# Patient Record
Sex: FEMALE | Race: OTHER | HISPANIC OR LATINO
[De-identification: names, ages, dates, MRNs, and addresses within clinical notes are randomized per-mention and may not be internally consistent; named-entity substitution may affect disease eponyms.]

---
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
## 2021-06-14 ENCOUNTER — APPOINTMENT (OUTPATIENT)
Dept: PEDIATRICS | Facility: CLINIC | Age: 21
End: 2021-06-14
Payer: MEDICAID

## 2021-06-14 VITALS
TEMPERATURE: 98 F | BODY MASS INDEX: 24.85 KG/M2 | HEART RATE: 90 BPM | DIASTOLIC BLOOD PRESSURE: 82 MMHG | WEIGHT: 154.6 LBS | HEIGHT: 66 IN | SYSTOLIC BLOOD PRESSURE: 123 MMHG

## 2021-06-14 DIAGNOSIS — Z00.121 ENCOUNTER FOR ROUTINE CHILD HEALTH EXAMINATION WITH ABNORMAL FINDINGS: ICD-10-CM

## 2021-06-14 DIAGNOSIS — Z87.2 PERSONAL HISTORY OF DISEASES OF THE SKIN AND SUBCUTANEOUS TISSUE: ICD-10-CM

## 2021-06-14 PROCEDURE — 99395 PREV VISIT EST AGE 18-39: CPT

## 2021-06-14 RX ORDER — ERYTHROMYCIN AND BENZOYL PEROXIDE 3 %-5 %
5-3 KIT TOPICAL
Qty: 1 | Refills: 4 | Status: DISCONTINUED | COMMUNITY
Start: 2020-05-27 | End: 2021-06-14

## 2021-06-14 NOTE — HISTORY OF PRESENT ILLNESS
[Yes] : Patient goes to dentist yearly [Eats meals with family] : eats meals with family [Has family members/adults to turn to for help] : has family members/adults to turn to for help [Is permitted and is able to make independent decisions] : Is permitted and is able to make independent decisions [Sleep Concerns] : sleep concerns [Eats regular meals including adequate fruits and vegetables] : eats regular meals including adequate fruits and vegetables [Drinks non-sweetened liquids] : drinks non-sweetened liquids  [Calcium source] : calcium source [Has concerns about body or appearance] : does not have concerns about body or appearance [Has friends] : has friends [Uses electronic nicotine delivery system] : does not use electronic nicotine delivery system [Uses tobacco] : does not use tobacco [Uses drugs] : does not use drugs  [Drinks alcohol] : does not drink alcohol [No] : Patient has not had sexual intercourse. [FreeTextEntry7] : no pins and needles on scalp over the summer, last episode 1 month ago, lasts 5-10 minutes, thinks could be related to feeling anxious [FreeTextEntry8] : on birth control and menstrual cycle was regular but had slight bleeding for 2-3 days middle of cycle [de-identified] : in college and doing well [de-identified] : exercising 4 times a week [de-identified] : uses condoms consistently

## 2021-06-14 NOTE — DISCUSSION/SUMMARY
[FreeTextEntry1] : 21.4 yo well, \par improvement in sensory issue on scalp over the summer, possible anxiety related, advised to practice yoga and breathing techniques\par advised to follow up with hematology, labs to be done\par advised to keep track of menstrual cycle and follow up if break through bleeding continues.\par needs tetanus booster at end of November\par follow up with cardiology as last follow up was in 2017 related to aortic valve,\par Continue balanced diet with all food groups. Multivitamins advised. Brush teeth twice a day with toothbrush. Recommend visit to dentist. Maintain consistent daily routines and sleep schedule. Personal hygiene, puberty, and sexual health reviewed. Risky behaviors assessed. School discussed. Limit screen time to no more than 2 hours per day. Encourage physical activity.\par Return 1 year for routine well child check.\par

## 2021-06-17 DIAGNOSIS — R42 DIZZINESS AND GIDDINESS: ICD-10-CM

## 2021-06-17 DIAGNOSIS — I77.89 OTHER SPECIFIED DISORDERS OF ARTERIES AND ARTERIOLES: ICD-10-CM

## 2021-06-17 RX ORDER — FERROUS SULFATE 137(45) MG
142 (45 FE) TABLET, EXTENDED RELEASE ORAL
Qty: 1 | Refills: 0 | Status: DISCONTINUED | COMMUNITY
Start: 2020-05-15 | End: 2021-06-17

## 2021-06-17 RX ORDER — CHOLECALCIFEROL (VITAMIN D3) 1250 MCG
1.25 MG CAPSULE ORAL
Qty: 4 | Refills: 0 | Status: DISCONTINUED | COMMUNITY
Start: 2020-05-15 | End: 2021-06-17

## 2021-06-17 RX ORDER — NORETHINDRONE ACETATE AND ETHINYL ESTRADIOL AND FERROUS FUMARATE 1.5-30(21)
1.5-3 KIT ORAL DAILY
Qty: 3 | Refills: 2 | Status: DISCONTINUED | COMMUNITY
Start: 2020-06-02 | End: 2021-06-17

## 2021-06-28 ENCOUNTER — NON-APPOINTMENT (OUTPATIENT)
Age: 21
End: 2021-06-28

## 2021-06-28 ENCOUNTER — APPOINTMENT (OUTPATIENT)
Dept: CARDIOLOGY | Facility: CLINIC | Age: 21
End: 2021-06-28
Payer: MEDICAID

## 2021-06-28 VITALS
WEIGHT: 156 LBS | HEIGHT: 66 IN | SYSTOLIC BLOOD PRESSURE: 128 MMHG | OXYGEN SATURATION: 100 % | DIASTOLIC BLOOD PRESSURE: 85 MMHG | HEART RATE: 82 BPM | BODY MASS INDEX: 25.07 KG/M2

## 2021-06-28 DIAGNOSIS — I35.1 NONRHEUMATIC AORTIC (VALVE) INSUFFICIENCY: ICD-10-CM

## 2021-06-28 DIAGNOSIS — D68.0 VON WILLEBRAND'S DISEASE: ICD-10-CM

## 2021-06-28 PROCEDURE — 99204 OFFICE O/P NEW MOD 45 MIN: CPT

## 2021-06-28 PROCEDURE — 93000 ELECTROCARDIOGRAM COMPLETE: CPT

## 2021-06-28 NOTE — PHYSICAL EXAM

## 2021-06-28 NOTE — HISTORY OF PRESENT ILLNESS
[FreeTextEntry1] : This is the first outpatient cardiology visit for Ms. Cleary.\par \par H/o vWD, discovered during menses. Easy brusing. Never needed blood transfusions. No family history of bleeding. No major restrictions given. No h/o heart issues, although was told she had a mildly enlarged aorta after an echocardiogram (see below). No h/o HTN, DM, HLD, smoking. No FH of aortopathies. One uncle had CAD in his 60's; no SCD.\par \par CV testing performed due to lighheaded spells in 2017:\par Echo 2017 showed mildly dilated ascending aorta; otherwise normal. \par cMRI 2017 showed normal aorta\par \par Presents today for routine follow. \par \par Overall, feels well. Presents today with her father. College student. She has always had some mild ODEN, unchanged. She walks a lot and occasionally works out on stationary bike. No chest pain, shortness of breath at rest, edema, palpitations. No syncope. \par \par Today's EKG is normal.

## 2021-06-30 ENCOUNTER — NON-APPOINTMENT (OUTPATIENT)
Age: 21
End: 2021-06-30

## 2021-07-28 ENCOUNTER — APPOINTMENT (OUTPATIENT)
Dept: CV DIAGNOSITCS | Facility: HOSPITAL | Age: 21
End: 2021-07-28

## 2021-07-28 ENCOUNTER — RESULT CHARGE (OUTPATIENT)
Age: 21
End: 2021-07-28

## 2021-07-28 ENCOUNTER — APPOINTMENT (OUTPATIENT)
Dept: PEDIATRICS | Facility: CLINIC | Age: 21
End: 2021-07-28
Payer: MEDICAID

## 2021-07-28 VITALS — HEART RATE: 91 BPM | DIASTOLIC BLOOD PRESSURE: 77 MMHG | SYSTOLIC BLOOD PRESSURE: 115 MMHG

## 2021-07-28 VITALS — WEIGHT: 151 LBS | TEMPERATURE: 97.8 F

## 2021-07-28 DIAGNOSIS — R42 DIZZINESS AND GIDDINESS: ICD-10-CM

## 2021-07-28 PROCEDURE — 99215 OFFICE O/P EST HI 40 MIN: CPT

## 2021-07-28 NOTE — DISCUSSION/SUMMARY
[FreeTextEntry1] : 20 yo with vertigo, probable vestibular neuritis or benign positional vertigo, not orhostatic, normal neurological exam\par rapid strep neg\par respiratory panel\par may try benadryl 50 mg every 6 hours\par fluids, rest, caution with changes of position\par to neuro if does not improve

## 2021-07-28 NOTE — HISTORY OF PRESENT ILLNESS
[de-identified] : dizzy [FreeTextEntry6] : dizzy this am when awakened, vomited 4 times this am, no fever, headache mild, no abdominal pain, feels uncomfortable with acid reflux, slight sore throat, no covid contacts

## 2021-07-28 NOTE — PHYSICAL EXAM
[NL] : warm [FreeTextEntry5] : no nystagmus [de-identified] : unsteady gait, dizziness with changes in position, normal cranial nerve exam and strength and sensory exam

## 2021-08-02 LAB
BACTERIA THROAT CULT: NORMAL
RAPID RVP RESULT: NOT DETECTED
SARS-COV-2 RNA PNL RESP NAA+PROBE: NOT DETECTED

## 2021-08-04 ENCOUNTER — APPOINTMENT (OUTPATIENT)
Dept: NEUROLOGY | Facility: CLINIC | Age: 21
End: 2021-08-04

## 2021-08-25 ENCOUNTER — EMERGENCY (EMERGENCY)
Age: 21
LOS: 1 days | Discharge: HOME | End: 2021-08-25
Attending: EMERGENCY MEDICINE | Admitting: PEDIATRICS
Payer: MEDICAID

## 2021-08-25 VITALS
OXYGEN SATURATION: 100 % | HEART RATE: 80 BPM | RESPIRATION RATE: 20 BRPM | SYSTOLIC BLOOD PRESSURE: 120 MMHG | TEMPERATURE: 98 F | DIASTOLIC BLOOD PRESSURE: 73 MMHG

## 2021-08-25 VITALS
DIASTOLIC BLOOD PRESSURE: 84 MMHG | SYSTOLIC BLOOD PRESSURE: 134 MMHG | RESPIRATION RATE: 18 BRPM | OXYGEN SATURATION: 100 % | HEART RATE: 80 BPM | TEMPERATURE: 99 F | WEIGHT: 150.91 LBS

## 2021-08-25 PROCEDURE — 99284 EMERGENCY DEPT VISIT MOD MDM: CPT

## 2021-08-25 PROCEDURE — 93010 ELECTROCARDIOGRAM REPORT: CPT

## 2021-08-25 PROCEDURE — 71046 X-RAY EXAM CHEST 2 VIEWS: CPT | Mod: 26

## 2021-08-25 RX ORDER — IBUPROFEN 200 MG
400 TABLET ORAL ONCE
Refills: 0 | Status: COMPLETED | OUTPATIENT
Start: 2021-08-25 | End: 2021-08-25

## 2021-08-25 RX ADMIN — Medication 400 MILLIGRAM(S): at 17:18

## 2021-08-25 NOTE — ED PROVIDER NOTE - PATIENT PORTAL LINK FT
You can access the FollowMyHealth Patient Portal offered by [facility] by registering at the following website: [email or web address]. By joining [employer]’s FollowMyHealth portal, you will also be able to view your health information using other applications (apps) compatible with our system.

## 2021-08-25 NOTE — ED PEDIATRIC TRIAGE NOTE - CHIEF COMPLAINT QUOTE
pt c/o of back pain for a few days and now chest pain to right side and left arm pain. pain on inspiration. lungs clear B/L. no fevers. follows with peds cardiology. PMH: enlarged aorta.

## 2021-08-25 NOTE — ED PROVIDER NOTE - NSFOLLOWUPINSTRUCTIONS_ED_ALL_ED_FT
Chest pain is an uncomfortable, tight, or painful feeling in the chest. Chest pain may go away on its own and is usually not dangerous.    What are the causes?  Common causes of chest pain include:    Receiving a direct blow to the chest.  A pulled muscle (strain).  Muscle cramping.  A pinched nerve.  A lung infection (pneumonia).  Asthma.  Coughing.  Stress.  Acid reflux.    Follow these instructions at home:  Have your child avoid physical activity if it causes pain.  Have you child avoid lifting heavy objects.  If directed by your child's caregiver, put ice on the injured area.    Put ice in a plastic bag.  Place a towel between your child's skin and the bag.  Leave the ice on for 15–20 minutes, 3–4 times a day.    Only give your child over-the-counter or prescription medicines as directed by his or her caregiver.  Give your child antibiotic medicine as directed. Make sure your child finishes it even if he or she starts to feel better.  Get help right away if:  Your child’s chest pain becomes severe and radiates into the neck, arms, or jaw.  Your child has difficulty breathing.  Your child's heart starts to beat fast while he or she is at rest.  Your child who is younger than 3 months has a fever.  Your child who is older than 3 months has a fever and persistent symptoms.  Your child who is older than 3 months has a fever and symptoms suddenly get worse.  Your child faints.  Your child coughs up blood.  Your child coughs up phlegm that appears pus-like (sputum).  Your child’s chest pain worsens.  This information is not intended to replace advice given to you by your health care provider. Make sure you discuss any questions you have with your health care provider.

## 2021-08-25 NOTE — ED PROVIDER NOTE - NS ED ROS FT
General: denies fever, chills  HENT: denies nasal congestion, rhinorrhea  Eyes: denies visual changes, blurred vision  CV: + R chest pain, denies palpitations  Resp: Right chest pain with deep inspiration, denies cough  Abdominal: denies nausea, vomiting, diarrhea, abdominal pain  : denies urinary pain or discharge  MSK: Point tenderness R. upper back/chest  Neuro: denies headaches, numbness, tingling  Skin: denies rashes, bruises

## 2021-08-25 NOTE — ED PROVIDER NOTE - PROGRESS NOTE DETAILS
The EKG was normal sinus rhythm, normal QTc and no signs of arrythmia or myocarditis. The CXR was normal. Given the pain is reproducible with palpation, not associated with syncope/palpitations/SOB, and her exam is benign, it is likely secondary to costochondritis vs muscle sprain. Will discharge home with Motrin q6h prn and PCP follow up.  Fellow Note: Viktoriya Lee, DO PGY-6 The EKG was normal sinus rhythm, normal QTc and no signs of arrythmia or myocarditis. The CXR was normal. Given the pain is reproducible with palpation, not associated with syncope/palpitations/SOB, and her exam is benign, it is likely secondary to costochondritis vs muscle sprain. Will discharge home with Motrin q6h prn and PCP follow up.  Fellow Note: Viktoriya Lee DO PGY-6  Attending Assessment: pt endorsed to me by Dr. Nielson, agree with above, yordan elam with supportive care, CXR normal, Be Allen MD

## 2021-08-25 NOTE — ED PROVIDER NOTE - CLINICAL SUMMARY MEDICAL DECISION MAKING FREE TEXT BOX
21F with a h/o enlarged aorta, von Willebrand, presents with 4 days of right upper back pain and 2 days of right sided chest pain that started after sleeping in a chair for 2 nights straight. Pain is pleuritic, worse with deep inspiration. AVSS. Reproducible point tenderness on back. Most likely MSK vs spon pneumo. Will get ECG, CXR, give motrin, and reassess. 21F with a h/o enlarged aorta, von Willebrand, presents with 4 days of right upper back pain and 2 days of right sided chest pain that started after sleeping in a chair for 2 nights straight. Pain is pleuritic, worse with deep inspiration. AVSS. Reproducible point tenderness on back. Most likely MSK vs spon pneumo. Will get ECG, CXR, give motrin, and reassess.    Attending: Agree with above. Likely msk however given history of aortic enlargement will obtain EKG and CXR. Motrin for pain. Reassess. PERLA Nielson MD [facility] Attending

## 2021-08-25 NOTE — ED PROVIDER NOTE - OBJECTIVE STATEMENT
21F with a h/o enlarged aorta, von Willebrand, presents with 4 days of right upper back pain and 2 days of right sided chest pain. Pain began after sleeping in a chair 2 evenings in a row, is sharp, pleuritic in nature, 1-2/10 at rest, 10/10 with deep breaths. Patient called PCP yesterday, and given her symptoms and history of enlarged aorta her PCP recommended coming to the ED for evaluation. Non-smoker, Denies trauma, cough, palpitations, fever/chills. Covid Vaccinated: 2nd dose approx July 4th. 20 y/o F with a h/o enlarged aorta, von Willebrand, presents with 4 days of right upper back pain and 2 days of right sided chest pain. Pain began after sleeping in a chair 2 evenings in a row, is sharp, pleuritic in nature, 1-2/10 at rest, 10/10 with deep breaths. Patient called PCP yesterday, and given her symptoms and history of enlarged aorta her PCP recommended coming to the ED for evaluation. Non-smoker, Denies trauma, cough, palpitations, fever/chills. Covid Vaccinated: 2nd dose approx July 4th.

## 2021-08-25 NOTE — ED PROVIDER NOTE - ATTENDING CONTRIBUTION TO CARE
The resident's documentation has been prepared under my direction and personally reviewed by me in its entirety. I confirm that the note above accurately reflects all work, treatment, procedures, and medical decision making performed by me. Please see CAPRICE Nielson MD PEM Attending

## 2021-09-08 ENCOUNTER — NON-APPOINTMENT (OUTPATIENT)
Age: 21
End: 2021-09-08

## 2021-12-21 ENCOUNTER — APPOINTMENT (OUTPATIENT)
Dept: PEDIATRICS | Facility: CLINIC | Age: 21
End: 2021-12-21
Payer: MEDICAID

## 2021-12-21 LAB — S PYO AG SPEC QL IA: NEGATIVE

## 2021-12-21 PROCEDURE — 99213 OFFICE O/P EST LOW 20 MIN: CPT | Mod: 25

## 2021-12-21 PROCEDURE — 87880 STREP A ASSAY W/OPTIC: CPT | Mod: QW

## 2021-12-21 NOTE — HISTORY OF PRESENT ILLNESS
[de-identified] : covid test [FreeTextEntry6] : wants covid test prior to holiday, no fever or sore throat, no uri symptoms

## 2021-12-21 NOTE — DISCUSSION/SUMMARY
[FreeTextEntry1] : 22 yo with pharyngitis\par covid PCR\par rapid strep neg\par follow up if symptoms persist or worsen\par

## 2021-12-23 LAB — SARS-COV-2 N GENE NPH QL NAA+PROBE: NOT DETECTED

## 2021-12-26 LAB — BACTERIA THROAT CULT: NORMAL

## 2021-12-27 ENCOUNTER — APPOINTMENT (OUTPATIENT)
Dept: PEDIATRICS | Facility: CLINIC | Age: 21
End: 2021-12-27
Payer: MEDICAID

## 2021-12-27 DIAGNOSIS — Z87.09 PERSONAL HISTORY OF OTHER DISEASES OF THE RESPIRATORY SYSTEM: ICD-10-CM

## 2021-12-27 LAB
S PYO AG SPEC QL IA: NEGATIVE
SARS-COV-2 AG RESP QL IA.RAPID: NEGATIVE

## 2021-12-27 PROCEDURE — 87880 STREP A ASSAY W/OPTIC: CPT | Mod: QW

## 2021-12-27 PROCEDURE — 99213 OFFICE O/P EST LOW 20 MIN: CPT

## 2021-12-27 PROCEDURE — 87811 SARS-COV-2 COVID19 W/OPTIC: CPT

## 2021-12-27 NOTE — DISCUSSION/SUMMARY
[FreeTextEntry1] : 22 yo with covid exposure, headache, sore throat and back pain\par rapid strep neg\par rapid covid neg\par covid PCR\par fluids\par follow up if symptoms persist or worsen\par

## 2021-12-27 NOTE — PHYSICAL EXAM
[Erythematous Oropharynx] : erythematous oropharynx [NL] : warm [FreeTextEntry7] : breathing comfortably

## 2021-12-27 NOTE — HISTORY OF PRESENT ILLNESS
[de-identified] : covid exposure [FreeTextEntry6] : boyfriend today with fever and covid positive, last exposure was Saturday\par back pain today and sore throat and headache, no fever

## 2021-12-29 LAB — SARS-COV-2 N GENE NPH QL NAA+PROBE: NOT DETECTED

## 2021-12-30 LAB — BACTERIA THROAT CULT: NORMAL

## 2022-02-22 ENCOUNTER — APPOINTMENT (OUTPATIENT)
Dept: PEDIATRICS | Facility: CLINIC | Age: 22
End: 2022-02-22
Payer: MEDICAID

## 2022-02-22 VITALS — TEMPERATURE: 98.5 F | WEIGHT: 150 LBS

## 2022-02-22 DIAGNOSIS — K21.9 GASTRO-ESOPHAGEAL REFLUX DISEASE W/OUT ESOPHAGITIS: ICD-10-CM

## 2022-02-22 PROCEDURE — 99214 OFFICE O/P EST MOD 30 MIN: CPT

## 2022-02-22 RX ORDER — FAMOTIDINE 20 MG/1
20 TABLET, FILM COATED ORAL DAILY
Qty: 1 | Refills: 3 | Status: ACTIVE | COMMUNITY
Start: 2022-02-22 | End: 1900-01-01

## 2022-02-22 NOTE — HISTORY OF PRESENT ILLNESS
[de-identified] : burning in chest [FreeTextEntry6] : burning and gassy in chest area with eating or after eating, especially at night, for past 2 years intermittent, takes tums and helps, these episodes are happening more frequently, stooling regularly, stress in school

## 2022-02-22 NOTE — DISCUSSION/SUMMARY
[FreeTextEntry1] : 2 yr with reflux, stress\par discussed modifications to diet, exercise\par pepcid daily\par follow up in 1-2 weeks\par

## 2022-09-05 ENCOUNTER — NON-APPOINTMENT (OUTPATIENT)
Age: 22
End: 2022-09-05

## 2023-04-05 ENCOUNTER — APPOINTMENT (OUTPATIENT)
Dept: DERMATOLOGY | Facility: CLINIC | Age: 23
End: 2023-04-05

## 2023-11-28 ENCOUNTER — NON-APPOINTMENT (OUTPATIENT)
Age: 23
End: 2023-11-28

## 2024-02-03 ENCOUNTER — NON-APPOINTMENT (OUTPATIENT)
Age: 24
End: 2024-02-03

## 2024-02-15 ENCOUNTER — APPOINTMENT (OUTPATIENT)
Dept: PEDIATRICS | Facility: CLINIC | Age: 24
End: 2024-02-15

## 2024-03-22 ENCOUNTER — EMERGENCY (EMERGENCY)
Facility: HOSPITAL | Age: 24
LOS: 1 days | Discharge: HOME | End: 2024-03-22
Attending: EMERGENCY MEDICINE | Admitting: EMERGENCY MEDICINE
Payer: MEDICAID

## 2024-03-22 VITALS
SYSTOLIC BLOOD PRESSURE: 115 MMHG | OXYGEN SATURATION: 100 % | TEMPERATURE: 98 F | DIASTOLIC BLOOD PRESSURE: 79 MMHG | RESPIRATION RATE: 18 BRPM | HEART RATE: 91 BPM

## 2024-03-22 VITALS
TEMPERATURE: 98 F | RESPIRATION RATE: 18 BRPM | HEART RATE: 110 BPM | DIASTOLIC BLOOD PRESSURE: 84 MMHG | SYSTOLIC BLOOD PRESSURE: 137 MMHG | OXYGEN SATURATION: 100 %

## 2024-03-22 LAB
ALBUMIN SERPL ELPH-MCNC: 4.5 G/DL — SIGNIFICANT CHANGE UP (ref 3.3–5)
ALP SERPL-CCNC: 92 U/L — SIGNIFICANT CHANGE UP (ref 40–120)
ALT FLD-CCNC: 8 U/L — SIGNIFICANT CHANGE UP (ref 4–33)
ANION GAP SERPL CALC-SCNC: 12 MMOL/L — SIGNIFICANT CHANGE UP (ref 7–14)
AST SERPL-CCNC: 15 U/L — SIGNIFICANT CHANGE UP (ref 4–32)
BASOPHILS # BLD AUTO: 0.03 K/UL — SIGNIFICANT CHANGE UP (ref 0–0.2)
BASOPHILS NFR BLD AUTO: 0.3 % — SIGNIFICANT CHANGE UP (ref 0–2)
BILIRUB SERPL-MCNC: 0.2 MG/DL — SIGNIFICANT CHANGE UP (ref 0.2–1.2)
BUN SERPL-MCNC: 10 MG/DL — SIGNIFICANT CHANGE UP (ref 7–23)
CALCIUM SERPL-MCNC: 9.5 MG/DL — SIGNIFICANT CHANGE UP (ref 8.4–10.5)
CHLORIDE SERPL-SCNC: 106 MMOL/L — SIGNIFICANT CHANGE UP (ref 98–107)
CO2 SERPL-SCNC: 24 MMOL/L — SIGNIFICANT CHANGE UP (ref 22–31)
CREAT SERPL-MCNC: 0.76 MG/DL — SIGNIFICANT CHANGE UP (ref 0.5–1.3)
D DIMER BLD IA.RAPID-MCNC: <150 NG/ML DDU — SIGNIFICANT CHANGE UP
EGFR: 113 ML/MIN/1.73M2 — SIGNIFICANT CHANGE UP
EOSINOPHIL # BLD AUTO: 0.15 K/UL — SIGNIFICANT CHANGE UP (ref 0–0.5)
EOSINOPHIL NFR BLD AUTO: 1.7 % — SIGNIFICANT CHANGE UP (ref 0–6)
GLUCOSE SERPL-MCNC: 92 MG/DL — SIGNIFICANT CHANGE UP (ref 70–99)
HCG SERPL-ACNC: <1 MIU/ML — SIGNIFICANT CHANGE UP
HCT VFR BLD CALC: 41.2 % — SIGNIFICANT CHANGE UP (ref 34.5–45)
HGB BLD-MCNC: 14.3 G/DL — SIGNIFICANT CHANGE UP (ref 11.5–15.5)
IANC: 4.35 K/UL — SIGNIFICANT CHANGE UP (ref 1.8–7.4)
IMM GRANULOCYTES NFR BLD AUTO: 0.3 % — SIGNIFICANT CHANGE UP (ref 0–0.9)
LYMPHOCYTES # BLD AUTO: 3.61 K/UL — HIGH (ref 1–3.3)
LYMPHOCYTES # BLD AUTO: 41.7 % — SIGNIFICANT CHANGE UP (ref 13–44)
MCHC RBC-ENTMCNC: 31 PG — SIGNIFICANT CHANGE UP (ref 27–34)
MCHC RBC-ENTMCNC: 34.7 GM/DL — SIGNIFICANT CHANGE UP (ref 32–36)
MCV RBC AUTO: 89.2 FL — SIGNIFICANT CHANGE UP (ref 80–100)
MONOCYTES # BLD AUTO: 0.49 K/UL — SIGNIFICANT CHANGE UP (ref 0–0.9)
MONOCYTES NFR BLD AUTO: 5.7 % — SIGNIFICANT CHANGE UP (ref 2–14)
NEUTROPHILS # BLD AUTO: 4.35 K/UL — SIGNIFICANT CHANGE UP (ref 1.8–7.4)
NEUTROPHILS NFR BLD AUTO: 50.3 % — SIGNIFICANT CHANGE UP (ref 43–77)
NRBC # BLD: 0 /100 WBCS — SIGNIFICANT CHANGE UP (ref 0–0)
NRBC # FLD: 0 K/UL — SIGNIFICANT CHANGE UP (ref 0–0)
NT-PROBNP SERPL-SCNC: 40 PG/ML — SIGNIFICANT CHANGE UP
PLATELET # BLD AUTO: 385 K/UL — SIGNIFICANT CHANGE UP (ref 150–400)
POTASSIUM SERPL-MCNC: 3.8 MMOL/L — SIGNIFICANT CHANGE UP (ref 3.5–5.3)
POTASSIUM SERPL-SCNC: 3.8 MMOL/L — SIGNIFICANT CHANGE UP (ref 3.5–5.3)
PROT SERPL-MCNC: 8 G/DL — SIGNIFICANT CHANGE UP (ref 6–8.3)
RBC # BLD: 4.62 M/UL — SIGNIFICANT CHANGE UP (ref 3.8–5.2)
RBC # FLD: 11.8 % — SIGNIFICANT CHANGE UP (ref 10.3–14.5)
SODIUM SERPL-SCNC: 142 MMOL/L — SIGNIFICANT CHANGE UP (ref 135–145)
TROPONIN T, HIGH SENSITIVITY RESULT: <6 NG/L — SIGNIFICANT CHANGE UP
WBC # BLD: 8.66 K/UL — SIGNIFICANT CHANGE UP (ref 3.8–10.5)
WBC # FLD AUTO: 8.66 K/UL — SIGNIFICANT CHANGE UP (ref 3.8–10.5)

## 2024-03-22 PROCEDURE — 99285 EMERGENCY DEPT VISIT HI MDM: CPT

## 2024-03-22 PROCEDURE — 71046 X-RAY EXAM CHEST 2 VIEWS: CPT | Mod: 26

## 2024-03-22 PROCEDURE — 93010 ELECTROCARDIOGRAM REPORT: CPT

## 2024-03-22 RX ORDER — SODIUM CHLORIDE 9 MG/ML
1000 INJECTION INTRAMUSCULAR; INTRAVENOUS; SUBCUTANEOUS ONCE
Refills: 0 | Status: COMPLETED | OUTPATIENT
Start: 2024-03-22 | End: 2024-03-22

## 2024-03-22 RX ORDER — SODIUM CHLORIDE 9 MG/ML
1000 INJECTION INTRAMUSCULAR; INTRAVENOUS; SUBCUTANEOUS ONCE
Refills: 0 | Status: DISCONTINUED | OUTPATIENT
Start: 2024-03-22 | End: 2024-03-22

## 2024-03-22 RX ORDER — ACETAMINOPHEN 500 MG
650 TABLET ORAL ONCE
Refills: 0 | Status: COMPLETED | OUTPATIENT
Start: 2024-03-22 | End: 2024-03-22

## 2024-03-22 RX ADMIN — Medication 650 MILLIGRAM(S): at 21:43

## 2024-03-22 RX ADMIN — SODIUM CHLORIDE 1000 MILLILITER(S): 9 INJECTION INTRAMUSCULAR; INTRAVENOUS; SUBCUTANEOUS at 21:42

## 2024-03-22 NOTE — ED PROVIDER NOTE - OBJECTIVE STATEMENT
The patient is a 25 y/o F with past medical history of von willebrand disease presenting with c/o chest pain, located primary over R anterior superior ribs as well as upper back pain. Onset occurred 1 week ago, some relief with tylenol, but patient concerned because still having the pain after a week. No associated dyspnea, headaches, fever, abdominal pain, nausea, vomiting, diarrhea, dysuria, or any other symptoms. Per chart, patient previously noted to have hx of "enlarged aorta," states was diagnosed with this as a child but on several of her recently cardiology visits states this was not seen by her doctors. The patient is a 25 y/o F with past medical history of von willebrand disease presenting with c/o chest pain, located primary over R anterior superior ribs as well as upper back pain. Onset occurred 1 week ago, some relief with tylenol, but patient concerned because still having the pain after a week. No trauma or injury. No associated dyspnea, headaches, fever, abdominal pain, nausea, vomiting, diarrhea, dysuria, or any other symptoms. Per chart, patient previously noted to have hx of "enlarged aorta," states was diagnosed with this as a child but on several of her recently cardiology visits states this was not seen by her doctors. No recent travel.

## 2024-03-22 NOTE — ED PROVIDER NOTE - CLINICAL SUMMARY MEDICAL DECISION MAKING FREE TEXT BOX
KRISTAL Miner PGY1- patient here with 1 week of R upper chest pain and upper back pain. No trauma or injury. Mildly tachycardic, appearing comfortable, vitals otherwise normal. Tenderness to R upper costochondral junctions as well as bilateral parathoracic muscles/trapezius. Most likely musculoskeletal pain vs costochondritis. Will obtain ekg, labs including troponin, bnp, d-dimer; CXR, and give analgesics. Dispo pending workup.

## 2024-03-22 NOTE — ED ADULT NURSE NOTE - OBJECTIVE STATEMENT
24 year old female brought to room 12. Patient alert and oriented times four. Patient ambulatory at baseline. pt with co pain to her chest pain and  back since Sunday ,does not recall any injury. Pt denies sob. Patient states she thinks her pain started after lifting her nephew out of the bathtub. Patient denies any falls. Patient refused fluids and IV. MD Hightower made aware. awaiting further orders.

## 2024-03-22 NOTE — ED PROVIDER NOTE - PROGRESS NOTE DETAILS
KRISTAL Miner PGY1- patient not wanting fluid bolus, offered IV toradol but patient does not want this either.  Explained that tachycardia may be due to dehydration, however patient states is currently able to drink water. Will give tylenol PO and butterfly for labs. Patient feeling better.  Ate, while in the ER, refusing IV fluids.  But amenable to labs.  X-ray clear patient noted KRISTAL Miner PGY1- patient now stating wants IV fluids, will reorder trop wnl, dimer less than 150   plan update pt, trop wnl, dimer less than 150   plan update pt, Would like to go home.  Will discharge with copies of results, work note and referral for outpatient cardiology.  Strict return precautions.

## 2024-03-22 NOTE — ED PROVIDER NOTE - PHYSICAL EXAMINATION
General: well appearing, alert, oriented to person, time, place  Psych: mood appropriate  Head: normocephalic; atraumatic  Eyes: conjunctivae clear bilaterally, sclerae anicteric  ENT: no nasal flaring, patent nares  Cardio: mildly tachycardic, normal heart sounds  Resp: clear to auscultation bilaterally  GI: abdomen soft, nontender, nondistended  : no CVA tenderness  Neuro: strength 5/5 in upper and lower extremities; normal sensation  Skin: no rashes or bruising noted  MSK: normal movement of extremities  Lymph/Vasc: no LE edema General: well appearing, alert, oriented to person, time, place  Psych: mood appropriate  Head: normocephalic; atraumatic  Eyes: conjunctivae clear bilaterally, sclerae anicteric  ENT: no nasal flaring, patent nares  Cardio: mildly tachycardic, normal heart sounds  Resp: clear to auscultation bilaterally  GI: abdomen soft, nontender, nondistended  : no CVA tenderness  Neuro: strength 5/5 in upper and lower extremities; normal sensation  Skin: no rashes or bruising noted  MSK: normal movement of extremities; tenderness to palpation over R upper costochondral joints; tenderness over bilateral parathoracic muscles  Lymph/Vasc: no LE edema

## 2024-03-22 NOTE — ED ADULT NURSE NOTE - NS ED NURSE IV DC DT
Pt calls, extremely upset that pharmacy instructed him that cannot  until Sunday (12/10/23) and pharmacy NOT open on Sunday, rx dated 12/11/23 with  date today    ROUTED to April Coming-MD Mark Anthony PLEASE advise oxycodone refill date, INFORM pt AND pharmacy     Camille Hays RN, BSN  [facility]       23-Mar-2024 00:06

## 2024-03-22 NOTE — ED PROVIDER NOTE - PATIENT PORTAL LINK FT
You can access the FollowMyHealth Patient Portal offered by [facility] by registering at the following website: [email or web address]. By joining [employer]’s FollowMyHealth portal, you will also be able to view your health information using other applications (apps) compatible with our system.

## 2024-03-22 NOTE — ED ADULT TRIAGE NOTE - CHIEF COMPLAINT QUOTE
pt with co pain to her chest pain and  back since Sunday ,does not recall any injury. Pt denies sob.

## 2024-03-22 NOTE — ED ADULT NURSE NOTE - NSFALLUNIVINTERV_ED_ALL_ED
Bed/Stretcher in lowest position, wheels locked, appropriate side rails in place/Call bell, personal items and telephone in reach/Instruct patient to call for assistance before getting out of bed/chair/stretcher/Non-slip footwear applied when patient is off stretcher/[place] to call system/Physically safe environment - no spills, clutter or unnecessary equipment/Purposeful proactive rounding/Room/bathroom lighting operational, light cord in reach

## 2024-03-22 NOTE — ED PROVIDER NOTE - ATTENDING CONTRIBUTION TO CARE
Attending Statement: I have personally seen and examined this patient. I have fully participated in the care of this patient. I have reviewed all pertinent clinical information, including history physical exam, plan and the Resident's note and agree except as noted  24-year-old female history of von Willebrand's disease presents from home chief complaints for a week of chest discomfort, upper back pain.  Is been a constant discomfort.  No falls or trauma.  Worse at night when trying to sleep, worse with breathing and laying down.  Denies any shortness of breath or dyspnea on exertion.  Denies any fever or chills denies any cough.  Not on birth control.  Currently on her menstrual cycle denies pregnancy.  Spoke to her in private.  Patient is sexually active denies pregnancy no STIs.  Denies any smoking, alcohol or drug use.  No recent travel.  No recent hospitalizations.  Patient with family at bedside comfortable with sharing information with them.  Vital signs noted slightly tachycardic in triage 110 and EKG with heart rate 96.  Not hypoxic and not tachypneic.  Well-appearing female ANO x 3.  No work of breathing. normal S1-S2 no rash no bruise. No resp distress. able to speak in full and clear sentences. no wheeze, rales or stridor. soft nontender abdomen. no  rebound. no guarding. no sign of trauma. no CVAT no pedal edema. no calf tenderness. normal pulses bilateral feet.   Plan EKG, labs, chest x-ray, pain meds and reassess.  EKG sr no MICHAEL

## 2024-03-22 NOTE — ED PROVIDER NOTE - NSFOLLOWUPINSTRUCTIONS_ED_ALL_ED_FT
[facility]  General Internal Medicine  [address] [place] [address]  Phone: [phone]  Fax:     [facility]  Internal Medicine  [address] [place] [address]  Phone: [phone]  Fax: [phone]    [facility]  Cardiology  [address] [place] [address]  Phone: [phone]    Chest Pain  WHAT YOU NEED TO KNOW:  Chest pain can be caused by a range of conditions, from not serious to life-threatening. Chest pain can be a symptom of a digestive problem, such as acid reflux or a stomach ulcer. An anxiety attack or a strong emotion, such as anger, can also cause chest pain. Infection, inflammation, or a fracture in the bones or cartilage in your chest can cause pain or discomfort. Sometimes chest pain or pressure is caused by poor blood flow to your heart (angina). Chest pain may also be caused by life-threatening conditions such as a heart attack or blood clot in your lungs.   DISCHARGE INSTRUCTIONS:  Call 911 if:   •You have any of the following signs of a heart attack: ?Squeezing, pressure, or pain in your chest  ?You may also have any of the following: ?Discomfort or pain in your back, neck, jaw, stomach, or arm  ?Shortness of breath  ?Nausea or vomiting  ?Lightheadedness or a sudden cold sweat  Seek care immediately if:   •You have chest discomfort that gets worse, even with medicine.  •You cough or vomit blood.   •Your bowel movements are black or bloody.   •You cannot stop vomiting, or it hurts to swallow.   Contact your healthcare provider if:   •You have questions or concerns about your condition or care.  Medicines:   •Medicines may be given to treat the cause of your chest pain. Examples include pain medicine, anxiety medicine, or medicines to increase blood flow to your heart.   •Do not take certain medicines without asking your healthcare provider first. These include NSAIDs, herbal or vitamin supplements, or hormones (estrogen or progestin).   •Take your medicine as directed. Contact your healthcare provider if you think your medicine is not helping or if you have side effects. Tell him or her if you are allergic to any medicine. Keep a list of the medicines, vitamins, and herbs you take. Include the amounts, and when and why you take them. Bring the list or the pill bottles to follow-up visits. Carry your medicine list with you in case of an emergency.  Follow up with your healthcare provider within 72 hours, or as directed: You may need to return for more tests to find the cause of your chest pain. You may be referred to a specialist, such as a cardiologist or gastroenterologist. Write down your questions so you remember to ask them during your visits.   Healthy living tips: The following are general healthy guidelines. If your chest pain is caused by a heart problem, your healthcare provider will give you specific guidelines to follow.  •Do not smoke. Nicotine and other chemicals in cigarettes and cigars can cause lung and heart damage. Ask your healthcare provider for information if you currently smoke and need help to quit. E-cigarettes or smokeless tobacco still contain nicotine. Talk to your healthcare provider before you use these products.   •Eat a variety of healthy, low-fat, low-salt foods. Healthy foods include fruits, vegetables, whole-grain breads, low-fat dairy products, beans, lean meats, and fish. Ask for more information about a heart healthy diet.  •Drink plenty of water every day. Your body is made of mostly water. Water helps your body to control temperature and blood pressure. Ask your healthcare provider how much water you should drink every day.  •Ask about activity. Your healthcare provider will tell you which activities to limit or avoid. Ask when you can drive, return to work, and have sex. Ask about the best exercise plan for you.  •Maintain a healthy weight. Ask your healthcare provider how much you should weigh. Ask him or her to help you create a weight loss plan if you are overweight.   If you have a stent:   •Carry your stent card with you at all times.   •Let all healthcare providers know that you have a stent.     [facility]  General Internal Medicine  [address] [place] [address]  Phone: [phone]  Fax:     [facility]  Internal Medicine  [address] [place] [address]  Phone: [phone]  Fax: [phone]    [facility]  Cardiology  [address] [place] [address]  Phone: [phone]    Chest Pain  WHAT YOU NEED TO KNOW:  Chest pain can be caused by a range of conditions, from not serious to life-threatening. Chest pain can be a symptom of a digestive problem, such as acid reflux or a stomach ulcer. An anxiety attack or a strong emotion, such as anger, can also cause chest pain. Infection, inflammation, or a fracture in the bones or cartilage in your chest can cause pain or discomfort. Sometimes chest pain or pressure is caused by poor blood flow to your heart (angina). Chest pain may also be caused by life-threatening conditions such as a heart attack or blood clot in your lungs.   DISCHARGE INSTRUCTIONS:  Call 911 if:   •You have any of the following signs of a heart attack: ?Squeezing, pressure, or pain in your chest  ?You may also have any of the following: ?Discomfort or pain in your back, neck, jaw, stomach, or arm  ?Shortness of breath  ?Nausea or vomiting  ?Lightheadedness or a sudden cold sweat  Seek care immediately if:   •You have chest discomfort that gets worse, even with medicine.  •You cough or vomit blood.   •Your bowel movements are black or bloody.   •You cannot stop vomiting, or it hurts to swallow.   Contact your healthcare provider if:   •You have questions or concerns about your condition or care.  Medicines:   •Medicines may be given to treat the cause of your chest pain. Examples include pain medicine, anxiety medicine, or medicines to increase blood flow to your heart.   •Do not take certain medicines without asking your healthcare provider first. These include NSAIDs, herbal or vitamin supplements, or hormones (estrogen or progestin).   •Take your medicine as directed. Contact your healthcare provider if you think your medicine is not helping or if you have side effects. Tell him or her if you are allergic to any medicine. Keep a list of the medicines, vitamins, and herbs you take. Include the amounts, and when and why you take them. Bring the list or the pill bottles to follow-up visits. Carry your medicine list with you in case of an emergency.  Follow up with your healthcare provider within 72 hours, or as directed: You may need to return for more tests to find the cause of your chest pain. You may be referred to a specialist, such as a cardiologist or gastroenterologist. Write down your questions so you remember to ask them during your visits.   Healthy living tips: The following are general healthy guidelines. If your chest pain is caused by a heart problem, your healthcare provider will give you specific guidelines to follow.  •Do not smoke. Nicotine and other chemicals in cigarettes and cigars can cause lung and heart damage. Ask your healthcare provider for information if you currently smoke and need help to quit. E-cigarettes or smokeless tobacco still contain nicotine. Talk to your healthcare provider before you use these products.   •Eat a variety of healthy, low-fat, low-salt foods. Healthy foods include fruits, vegetables, whole-grain breads, low-fat dairy products, beans, lean meats, and fish. Ask for more information about a heart healthy diet.  •Drink plenty of water every day. Your body is made of mostly water. Water helps your body to control temperature and blood pressure. Ask your healthcare provider how much water you should drink every day.  •Ask about activity. Your healthcare provider will tell you which activities to limit or avoid. Ask when you can drive, return to work, and have sex. Ask about the best exercise plan for you.  •Maintain a healthy weight. Ask your healthcare provider how much you should weigh. Ask him or her to help you create a weight loss plan if you are overweight.   If you have a stent:   •Carry your stent card with you at all times.   •Let all healthcare providers know that you have a stent.     Dolor de pecho    LO QUE NECESITA SABER:    El dolor en el pecho puede ser provocado por leo variedad de condiciones, algunas no tan serias y otras que son de peligro mortal. El dolor de pecho también puede ser un síntoma de un problema digestivo, domingo la acidez o leo úlcera estomacal. Un ataque de ansiedad o leo emoción jessy, domingo el enojo, también pueden provocar dolor de pecho. Leo infección, inflamación o fractura en un hueso o cartílago en el pecho podría provocar dolor o molestia. En ocasiones el dolor torácico o la presión en el pecho pueden ser el resultado de roberth circulación de la sathya al corazón (angina). El dolor de pecho también puede ser causado por trastornos potencialmente mortales domingo un ataque al corazón o un coágulo de sathya en los pulmones.    INSTRUCCIONES SOBRE EL [facility]AN HOSPITALARIA:    Llame al número local de emergencias (911 en los [place]) o pídale a alguien que llame si:    Tiene alguno de los siguientes signos de un ataque cardíaco:  Estrujamiento, presión o tensión en jensen pecho    Usted también podría presentar alguno de los siguientes:  Malestar o dolor en jensen espalda, hina, mandíbula, abdomen, o brazo    Falta de aliento    Náuseas o vómitos    Desvanecimiento o sudor frío repentino    Busque atención médica de inmediato si:    La inflamación en jensen pecho empeora, aun con tratamiento.    Usted tose o vomita sathya.    Torie heces son negras o tienen sathya.    Usted no puede dejar de vomitar o le duele al tragar.  Llame a jensen médico si:    Usted tiene preguntas o inquietudes acerca de jensen condición o cuidado.    Medicamentos:    Los medicamentospueden administrarse para tratar la causa del dolor de pecho. Por ejemplo, analgésicos, medicamentos para la ansiedad o medicamentos para aumentar el flujo de sathya al corazón.    No tome ciertos medicamentos sin antes preguntarle a jensen médico.Estos incluyen YUE, suplementos vitamínicos o a base de hierbas u hormonas (estrógeno o progestágeno).    Bovey torie medicamentos domingo se le haya indicado.Consulte con jensen médico si usted kimo que jensen medicamento no le está ayudando o si presenta efectos secundarios. Infórmele si es alérgico a cualquier medicamento. Mantenga leo lista actualizada de los medicamentos, las vitaminas y los productos herbales que shane. Incluya los siguientes datos de los medicamentos: cantidad, frecuencia y motivo de administración. Traiga con usted la lista o los envases de las píldoras a torie citas de seguimiento. Lleve la lista de los medicamentos con usted en liz de leo emergencia.  Consejos para vivir saludable:Los siguientes son consejos generales de boom. Si se conoce la causa de jensen dolor de pecho, jensen médico le dará pautas específicas a seguir.    No fume.La nicotina y otros químicos contenidos en los cigarrillos y cigarros pueden causar daño a torie pulmones y el corazón. Pida información a jensen médico si usted actualmente fuma y necesita ayuda para dejar de fumar. Los cigarrillos electrónicos o el tabaco sin humo igualmente contienen nicotina. Consulte con jensen médico antes de utilizar estos productos.    Elija leo variedad de alimentos saludables tan a menudo domingo sea posible.Incluya frutas y verduras frescas, congeladas o enlatadas. También incluya productos lácteos bajos en grasa, pescado, sejal (sin piel) y idris magras. Jensen médico o dietista pueden ayudarlo a crear planes de alimentos. Es posible que tenga que evitar ciertos alimentos o bebidas si el dolor es causado por un problema de digestión.  Alimentos saludables      Reduzca el consumo de sodio (sal).Limite el consumo de alimentos altos en sodio, domingo comidas enlatadas, bocadillos salados y embutidos. Si añade paige cuando cocina la comida, no añada más en la fay. Elija alimentos enlatados bajos en sodio tanto domingo sea posible.        Consuma abundante agua al día.El agua ayuda al cuerpo a controlar la temperatura y la presión arterial. Pregunte a jensen médico cuál es la cantidad de agua que debería consumir cada día.    Pregunte acerca de la actividad.Jensen médico le dirá cuáles actividades limitar y cuáles evitar. Pregunte cuándo puede manejar, regresar a jensen trabajo y tener relaciones sexuales. Pida más información acerca de un plan de ejercicio adecuado para usted.    Mantenga un peso saludable.Pregúntele a jensen médico cuál es el peso ideal para usted. Pídale que lo ayude a crear un plan seguro para bajar de peso si tiene sobrepeso.    Pregunte sobre las vacunas que pudiera necesitar.Vacúnese contra la influenza (gripe) todos los años tan pronto domingo se recomiende, normalmente en septiembre u octubre. Usted también podría necesitar la vacuna antineumocócica para evitar la neumonía. La vacuna se administra generalmente cada 5 años, a partir de los 65 años. Jensen médico puede indicarle si debe recibir otras vacunas, y cuándo aplicárselas.  Programe leo cindy con jensen médico dentro de 72 horas o domingo se le indique:Es posible que deba regresar para hacerse más pruebas para encontrar la causa del dolor de pecho. Es probable que lo refieran a un especialista, domingo un cardiólogo o un gastroenterólogo. Anote torie preguntas para que se acuerde de hacerlas stephan torie visitas.

## 2024-04-11 ENCOUNTER — APPOINTMENT (OUTPATIENT)
Dept: ORTHOPEDIC SURGERY | Facility: CLINIC | Age: 24
End: 2024-04-11

## 2024-04-18 ENCOUNTER — NON-APPOINTMENT (OUTPATIENT)
Age: 24
End: 2024-04-18

## 2024-04-18 ENCOUNTER — APPOINTMENT (OUTPATIENT)
Dept: DERMATOLOGY | Facility: CLINIC | Age: 24
End: 2024-04-18
Payer: MEDICAID

## 2024-04-18 DIAGNOSIS — L81.4 OTHER MELANIN HYPERPIGMENTATION: ICD-10-CM

## 2024-04-18 DIAGNOSIS — L82.1 OTHER SEBORRHEIC KERATOSIS: ICD-10-CM

## 2024-04-18 DIAGNOSIS — L21.9 SEBORRHEIC DERMATITIS, UNSPECIFIED: ICD-10-CM

## 2024-04-18 DIAGNOSIS — D22.9 MELANOCYTIC NEVI, UNSPECIFIED: ICD-10-CM

## 2024-04-18 DIAGNOSIS — L91.8 OTHER HYPERTROPHIC DISORDERS OF THE SKIN: ICD-10-CM

## 2024-04-18 PROCEDURE — 99204 OFFICE O/P NEW MOD 45 MIN: CPT

## 2024-04-18 RX ORDER — KETOCONAZOLE 20.5 MG/ML
2 SHAMPOO, SUSPENSION TOPICAL
Qty: 1 | Refills: 5 | Status: ACTIVE | COMMUNITY
Start: 2024-04-18 | End: 1900-01-01

## 2024-04-18 RX ORDER — MOMETASONE FUROATE 1 MG/ML
0.1 SOLUTION TOPICAL
Qty: 1 | Refills: 2 | Status: ACTIVE | COMMUNITY
Start: 2024-04-18 | End: 1900-01-01

## 2024-04-18 NOTE — HISTORY OF PRESENT ILLNESS
[FreeTextEntry1] : FBSE [de-identified] : Patient presents for skin check; No itching, bleeding, growing, changing lesions noted. No personal hx of skin cancer. Family hx of NMSC in grandmother.  Patient also c/o dryness and flaking of scalp. She has been using detox shampoos.

## 2024-04-18 NOTE — ASSESSMENT
[FreeTextEntry1] : A complete skin examination was performed.  There is no evidence of skin cancer.  We discussed the importance of photoprotection, including the use of hats, protective clothing and sunscreens with an SPF of at least 30.  Sun avoidance was also discussed.  The ABCDE's of melanoma were discussed.  Regular skin exams recommended.   Seborrheic dermatitis: We have discussed the nature and course of this condition. We have discussed treatment options, expectations from treatment, and associated side effects of topical therapies.  Mometasone solution BID x 1 week then QD x 2 weeks then prn flares Keto shampoo BIW  Discussed risks and benefits of topical steroids, including atrophy, telangiectasias, striae, acneiform eruption, purpura, changes in pigmentation, and hypertrichosis.    Skin tags, neck: Recommend removal in the fall; patient travelling next month

## 2024-04-18 NOTE — PHYSICAL EXAM
[Alert] : alert [Oriented x 3] : ~L oriented x 3 [Well Nourished] : well nourished [Conjunctiva Non-injected] : conjunctiva non-injected [No Visual Lymphadenopathy] : no visual  lymphadenopathy [No Clubbing] : no clubbing [No Edema] : no edema [No Bromhidrosis] : no bromhidrosis [No Chromhidrosis] : no chromhidrosis [FreeTextEntry3] : A full skin exam was performed including the scalp, face (including the lips, ears, nose and eyes), neck, chest, breasts, abdomen, back, buttocks, upper extremities and lower extremities.  The patient declined examination of the genitalia.   The exam revealed the following benign growths:  Pigmented nevi. Seborrheic keratoses. Lentigines.   Skin tags around neck  Flaking of scalp

## 2024-05-03 ENCOUNTER — EMERGENCY (EMERGENCY)
Facility: HOSPITAL | Age: 24
LOS: 1 days | Discharge: HOME | End: 2024-05-03
Admitting: STUDENT IN AN ORGANIZED HEALTH CARE EDUCATION/TRAINING PROGRAM
Payer: MEDICAID

## 2024-05-03 VITALS
SYSTOLIC BLOOD PRESSURE: 135 MMHG | DIASTOLIC BLOOD PRESSURE: 91 MMHG | RESPIRATION RATE: 15 BRPM | HEART RATE: 102 BPM | TEMPERATURE: 98 F | OXYGEN SATURATION: 100 %

## 2024-05-03 VITALS
HEART RATE: 115 BPM | RESPIRATION RATE: 17 BRPM | SYSTOLIC BLOOD PRESSURE: 116 MMHG | TEMPERATURE: 99 F | DIASTOLIC BLOOD PRESSURE: 84 MMHG | OXYGEN SATURATION: 100 %

## 2024-05-03 LAB
ALBUMIN SERPL ELPH-MCNC: 4.6 G/DL — SIGNIFICANT CHANGE UP (ref 3.3–5)
ALP SERPL-CCNC: 104 U/L — SIGNIFICANT CHANGE UP (ref 40–120)
ALT FLD-CCNC: 16 U/L — SIGNIFICANT CHANGE UP (ref 4–33)
ANION GAP SERPL CALC-SCNC: 15 MMOL/L — HIGH (ref 7–14)
APPEARANCE UR: CLEAR — SIGNIFICANT CHANGE UP
AST SERPL-CCNC: 22 U/L — SIGNIFICANT CHANGE UP (ref 4–32)
BACTERIA # UR AUTO: ABNORMAL /HPF
BASOPHILS # BLD AUTO: 0.02 K/UL — SIGNIFICANT CHANGE UP (ref 0–0.2)
BASOPHILS NFR BLD AUTO: 0.2 % — SIGNIFICANT CHANGE UP (ref 0–2)
BILIRUB SERPL-MCNC: 0.3 MG/DL — SIGNIFICANT CHANGE UP (ref 0.2–1.2)
BILIRUB UR-MCNC: NEGATIVE — SIGNIFICANT CHANGE UP
BUN SERPL-MCNC: 8 MG/DL — SIGNIFICANT CHANGE UP (ref 7–23)
CALCIUM SERPL-MCNC: 9.2 MG/DL — SIGNIFICANT CHANGE UP (ref 8.4–10.5)
CAST: 0 /LPF — SIGNIFICANT CHANGE UP (ref 0–4)
CHLORIDE SERPL-SCNC: 102 MMOL/L — SIGNIFICANT CHANGE UP (ref 98–107)
CO2 SERPL-SCNC: 21 MMOL/L — LOW (ref 22–31)
COLOR SPEC: YELLOW — SIGNIFICANT CHANGE UP
CREAT SERPL-MCNC: 0.65 MG/DL — SIGNIFICANT CHANGE UP (ref 0.5–1.3)
DIFF PNL FLD: NEGATIVE — SIGNIFICANT CHANGE UP
EGFR: 127 ML/MIN/1.73M2 — SIGNIFICANT CHANGE UP
EOSINOPHIL # BLD AUTO: 0.03 K/UL — SIGNIFICANT CHANGE UP (ref 0–0.5)
EOSINOPHIL NFR BLD AUTO: 0.3 % — SIGNIFICANT CHANGE UP (ref 0–6)
GLUCOSE SERPL-MCNC: 92 MG/DL — SIGNIFICANT CHANGE UP (ref 70–99)
GLUCOSE UR QL: NEGATIVE MG/DL — SIGNIFICANT CHANGE UP
HCG SERPL-ACNC: <1 MIU/ML — SIGNIFICANT CHANGE UP
HCT VFR BLD CALC: 42.4 % — SIGNIFICANT CHANGE UP (ref 34.5–45)
HGB BLD-MCNC: 14.2 G/DL — SIGNIFICANT CHANGE UP (ref 11.5–15.5)
IANC: 8.27 K/UL — HIGH (ref 1.8–7.4)
IMM GRANULOCYTES NFR BLD AUTO: 0.4 % — SIGNIFICANT CHANGE UP (ref 0–0.9)
KETONES UR-MCNC: NEGATIVE MG/DL — SIGNIFICANT CHANGE UP
LEUKOCYTE ESTERASE UR-ACNC: NEGATIVE — SIGNIFICANT CHANGE UP
LIDOCAIN IGE QN: 33 U/L — SIGNIFICANT CHANGE UP (ref 7–60)
LYMPHOCYTES # BLD AUTO: 19 % — SIGNIFICANT CHANGE UP (ref 13–44)
LYMPHOCYTES # BLD AUTO: 2.05 K/UL — SIGNIFICANT CHANGE UP (ref 1–3.3)
MCHC RBC-ENTMCNC: 30.5 PG — SIGNIFICANT CHANGE UP (ref 27–34)
MCHC RBC-ENTMCNC: 33.5 GM/DL — SIGNIFICANT CHANGE UP (ref 32–36)
MCV RBC AUTO: 91.2 FL — SIGNIFICANT CHANGE UP (ref 80–100)
MONOCYTES # BLD AUTO: 0.4 K/UL — SIGNIFICANT CHANGE UP (ref 0–0.9)
MONOCYTES NFR BLD AUTO: 3.7 % — SIGNIFICANT CHANGE UP (ref 2–14)
NEUTROPHILS # BLD AUTO: 8.27 K/UL — HIGH (ref 1.8–7.4)
NEUTROPHILS NFR BLD AUTO: 76.4 % — SIGNIFICANT CHANGE UP (ref 43–77)
NITRITE UR-MCNC: NEGATIVE — SIGNIFICANT CHANGE UP
NRBC # BLD: 0 /100 WBCS — SIGNIFICANT CHANGE UP (ref 0–0)
NRBC # FLD: 0 K/UL — SIGNIFICANT CHANGE UP (ref 0–0)
PH UR: 7 — SIGNIFICANT CHANGE UP (ref 5–8)
PLATELET # BLD AUTO: 420 K/UL — HIGH (ref 150–400)
POTASSIUM SERPL-MCNC: 3.6 MMOL/L — SIGNIFICANT CHANGE UP (ref 3.5–5.3)
POTASSIUM SERPL-SCNC: 3.6 MMOL/L — SIGNIFICANT CHANGE UP (ref 3.5–5.3)
PROT SERPL-MCNC: 8.5 G/DL — HIGH (ref 6–8.3)
PROT UR-MCNC: NEGATIVE MG/DL — SIGNIFICANT CHANGE UP
RBC # BLD: 4.65 M/UL — SIGNIFICANT CHANGE UP (ref 3.8–5.2)
RBC # FLD: 11.8 % — SIGNIFICANT CHANGE UP (ref 10.3–14.5)
RBC CASTS # UR COMP ASSIST: 1 /HPF — SIGNIFICANT CHANGE UP (ref 0–4)
REVIEW: SIGNIFICANT CHANGE UP
SODIUM SERPL-SCNC: 138 MMOL/L — SIGNIFICANT CHANGE UP (ref 135–145)
SP GR SPEC: 1 — LOW (ref 1–1.03)
SQUAMOUS # UR AUTO: 2 /HPF — SIGNIFICANT CHANGE UP (ref 0–5)
UROBILINOGEN FLD QL: 0.2 MG/DL — SIGNIFICANT CHANGE UP (ref 0.2–1)
WBC # BLD: 10.81 K/UL — HIGH (ref 3.8–10.5)
WBC # FLD AUTO: 10.81 K/UL — HIGH (ref 3.8–10.5)
WBC UR QL: 1 /HPF — SIGNIFICANT CHANGE UP (ref 0–5)

## 2024-05-03 PROCEDURE — 93010 ELECTROCARDIOGRAM REPORT: CPT

## 2024-05-03 PROCEDURE — 76705 ECHO EXAM OF ABDOMEN: CPT | Mod: 26

## 2024-05-03 PROCEDURE — 99284 EMERGENCY DEPT VISIT MOD MDM: CPT

## 2024-05-03 NOTE — ED PROVIDER NOTE - PATIENT PORTAL LINK FT
You can access the FollowMyHealth Patient Portal offered by [facility] by registering at the following website: [email or web address]. By joining [employer]’s FollowMyHealth portal, you will also be able to view your health information using other applications (apps) compatible with our system.

## 2024-05-03 NOTE — ED PROVIDER NOTE - PHYSICAL EXAMINATION
CONSTITUTIONAL: Well-appearing; well-nourished; in no apparent distress. Non-toxic appearing.   NEURO: Alert & oriented. Gait steady without assistance. Sensory and motor functions are grossly intact.  PSYCH: Mood appropriate. Thought processes intact.   NECK: Supple  CARD: Regular rate and rhythm, no murmurs  RESP: No accessory muscle use; breath sounds clear and equal bilaterally; no wheezes, rhonchi, or rales     ABD: Soft; non-distended; (+) epigastric tenderness, Neg Morrison's.  No guarding or rebound.   : No CVA tenderness b/l.   MUSCULOSKELETAL/EXTREMITIES: FROM in all four extremities; no extremity edema.  SKIN: Warm; dry; no apparent lesions or exudate

## 2024-05-03 NOTE — ED PROVIDER NOTE - CLINICAL SUMMARY MEDICAL DECISION MAKING FREE TEXT BOX
24-year-old female with history of von Willebrand's disease (not on medication currently) and gastritis (on pantoprazole) presents complaining of intermittent epigastric/right upper quadrant pain for 1 week.  Patient states that at times it feels like it radiates towards her back.  Patient denies associated fever, chills, nausea, vomiting, diarrhea, dysuria, hematuria, urinary frequency, vaginal bleeding/discharge.  Of note, patient states that she has been very anxious for about 1 month since she had an episode of chest pain that she was diagnosed at this time with costochondritis.  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  Patient notes that she does have a history of gastritis for which she takes pantoprazole and it is made worse by her anxiety and that is why her doctor started her on the medication. Regarding her anxiety pt denies feelings of sadness, SI or HI.   Vitals are notable for tachycardia 115 without fever, normotensive.  On exam, patient is anxious appearing with mild epigastric tenderness, no guarding or rebound, negative Morrison's.  Will obtain labs and right upper quadrant ultrasound to rule out biliary colic versus cholecystitis versus pancreatitis.  Follow progress notes to dispo will symptoms most likely secondary to patient's gastritis.

## 2024-05-03 NOTE — ED PROVIDER NOTE - PROGRESS NOTE DETAILS
RITA Fair: Labs reviewed notable for mild leukocytosis of 10.8 without left shift.  CMP within normal limits, nonactionable.  Urinalysis negative.  Upper quadrant ultrasound unremarkable, no gallstones noted or CBD dilatation.  Patient and mother counseled regarding these findings and the need for follow-up with her gastroenterologist to ensure gastritis and PCP and/or therapist to manage her anxiety.  Patient and mother counseled regarding signs/symptoms warranting return, all questions were answered, they verbalized understanding.

## 2024-05-03 NOTE — ED PROVIDER NOTE - NSICDXFAMILYHX_GEN_ALL_CORE_FT
FAMILY HISTORY:  Aunt  Still living? Unknown  Family history of cholecystitis, Age at diagnosis: Age Unknown

## 2024-05-03 NOTE — ED ADULT NURSE NOTE - OBJECTIVE STATEMENT
Pt received in intake, aaox3, ambulatory, breathing even and unlabored in bed. Pt states for a few days she has been experiencing diffuse abd pain with midsternal chest pains. Pt denies SOB, dizziness, headache, blurry vision, chills. Bed in lowest position, call bell within reach. #20G IV placed in the right AC, labs drawn and sent.

## 2024-05-03 NOTE — ED ADULT TRIAGE NOTE - CHIEF COMPLAINT QUOTE
c/o right epigastric pain radiating toward the back x 1 week, denies n/v/d. Denies urinary symptoms. Last BM today. LMP 4/20. hx of costochondritis, von willebrand

## 2024-05-04 LAB
HCV AB S/CO SERPL IA: 0.12 S/CO — SIGNIFICANT CHANGE UP (ref 0–0.99)
HCV AB SERPL-IMP: SIGNIFICANT CHANGE UP

## 2024-05-05 LAB
CULTURE RESULTS: SIGNIFICANT CHANGE UP
SPECIMEN SOURCE: SIGNIFICANT CHANGE UP

## 2024-12-04 ENCOUNTER — NON-APPOINTMENT (OUTPATIENT)
Age: 24
End: 2024-12-04

## 2024-12-20 DIAGNOSIS — R05.9 COUGH, UNSPECIFIED: ICD-10-CM

## 2024-12-20 DIAGNOSIS — Z13.83 ENCOUNTER FOR SCREENING FOR RESPIRATORY DISORDER NEC: ICD-10-CM

## 2025-01-15 ENCOUNTER — APPOINTMENT (OUTPATIENT)
Dept: PULMONOLOGY | Facility: CLINIC | Age: 25
End: 2025-01-15

## 2025-03-04 ENCOUNTER — APPOINTMENT (OUTPATIENT)
Dept: PULMONOLOGY | Facility: CLINIC | Age: 25
End: 2025-03-04
Payer: MEDICAID

## 2025-03-04 VITALS
HEIGHT: 66 IN | WEIGHT: 153 LBS | SYSTOLIC BLOOD PRESSURE: 116 MMHG | DIASTOLIC BLOOD PRESSURE: 81 MMHG | TEMPERATURE: 98 F | OXYGEN SATURATION: 100 % | BODY MASS INDEX: 24.59 KG/M2 | HEART RATE: 79 BPM | RESPIRATION RATE: 15 BRPM

## 2025-03-04 DIAGNOSIS — K21.9 GASTRO-ESOPHAGEAL REFLUX DISEASE W/OUT ESOPHAGITIS: ICD-10-CM

## 2025-03-04 DIAGNOSIS — R05.9 COUGH, UNSPECIFIED: ICD-10-CM

## 2025-03-04 PROCEDURE — 99204 OFFICE O/P NEW MOD 45 MIN: CPT
